# Patient Record
Sex: FEMALE | Race: ASIAN | NOT HISPANIC OR LATINO | URBAN - METROPOLITAN AREA
[De-identification: names, ages, dates, MRNs, and addresses within clinical notes are randomized per-mention and may not be internally consistent; named-entity substitution may affect disease eponyms.]

---

## 2022-04-08 ENCOUNTER — LAB REQUISITION (OUTPATIENT)
Dept: LAB | Facility: CLINIC | Age: 37
End: 2022-04-08

## 2022-04-08 DIAGNOSIS — R42 DIZZINESS AND GIDDINESS: ICD-10-CM

## 2022-04-08 LAB
ALBUMIN SERPL-MCNC: 3.8 G/DL (ref 3.4–5)
ALP SERPL-CCNC: 47 U/L (ref 40–150)
ALT SERPL W P-5'-P-CCNC: 19 U/L (ref 0–50)
ANION GAP SERPL CALCULATED.3IONS-SCNC: 6 MMOL/L (ref 3–14)
AST SERPL W P-5'-P-CCNC: 12 U/L (ref 0–45)
BILIRUB SERPL-MCNC: 0.3 MG/DL (ref 0.2–1.3)
BUN SERPL-MCNC: 11 MG/DL (ref 7–30)
CALCIUM SERPL-MCNC: 8.8 MG/DL (ref 8.5–10.1)
CHLORIDE BLD-SCNC: 106 MMOL/L (ref 94–109)
CO2 SERPL-SCNC: 27 MMOL/L (ref 20–32)
CREAT SERPL-MCNC: 0.58 MG/DL (ref 0.52–1.04)
ERYTHROCYTE [DISTWIDTH] IN BLOOD BY AUTOMATED COUNT: 22.5 % (ref 10–15)
GFR SERPL CREATININE-BSD FRML MDRD: >90 ML/MIN/1.73M2
GLUCOSE BLD-MCNC: 89 MG/DL (ref 70–99)
HCT VFR BLD AUTO: 29 % (ref 35–47)
HGB BLD-MCNC: 8.1 G/DL (ref 11.7–15.7)
MCH RBC QN AUTO: 16.1 PG (ref 26.5–33)
MCHC RBC AUTO-ENTMCNC: 27.9 G/DL (ref 31.5–36.5)
MCV RBC AUTO: 58 FL (ref 78–100)
PLATELET # BLD AUTO: 356 10E3/UL (ref 150–450)
POTASSIUM BLD-SCNC: 3.5 MMOL/L (ref 3.4–5.3)
PROT SERPL-MCNC: 8.9 G/DL (ref 6.8–8.8)
RBC # BLD AUTO: 5.04 10E6/UL (ref 3.8–5.2)
SODIUM SERPL-SCNC: 139 MMOL/L (ref 133–144)
TSH SERPL DL<=0.005 MIU/L-ACNC: 0.77 MU/L (ref 0.4–4)
WBC # BLD AUTO: 6 10E3/UL (ref 4–11)

## 2022-04-08 PROCEDURE — 84443 ASSAY THYROID STIM HORMONE: CPT | Performed by: INTERNAL MEDICINE

## 2022-04-08 PROCEDURE — 80053 COMPREHEN METABOLIC PANEL: CPT | Performed by: INTERNAL MEDICINE

## 2022-04-08 PROCEDURE — 85027 COMPLETE CBC AUTOMATED: CPT | Performed by: INTERNAL MEDICINE

## 2022-04-22 ENCOUNTER — LAB REQUISITION (OUTPATIENT)
Dept: LAB | Facility: CLINIC | Age: 37
End: 2022-04-22

## 2022-04-22 DIAGNOSIS — D50.9 IRON DEFICIENCY ANEMIA, UNSPECIFIED: ICD-10-CM

## 2022-04-22 LAB
FERRITIN SERPL-MCNC: 4 NG/ML (ref 12–150)
IRON SATN MFR SERPL: 4 % (ref 15–46)
IRON SERPL-MCNC: 17 UG/DL (ref 35–180)
TIBC SERPL-MCNC: 436 UG/DL (ref 240–430)

## 2022-04-22 PROCEDURE — 83550 IRON BINDING TEST: CPT | Performed by: INTERNAL MEDICINE

## 2022-04-22 PROCEDURE — 82728 ASSAY OF FERRITIN: CPT | Performed by: INTERNAL MEDICINE

## 2023-06-08 ENCOUNTER — OFFICE VISIT (OUTPATIENT)
Dept: FAMILY MEDICINE | Facility: CLINIC | Age: 38
End: 2023-06-08
Payer: COMMERCIAL

## 2023-06-08 VITALS
RESPIRATION RATE: 16 BRPM | SYSTOLIC BLOOD PRESSURE: 132 MMHG | OXYGEN SATURATION: 100 % | WEIGHT: 118.6 LBS | HEART RATE: 80 BPM | TEMPERATURE: 98.1 F | DIASTOLIC BLOOD PRESSURE: 88 MMHG

## 2023-06-08 DIAGNOSIS — F32.1 CURRENT MODERATE EPISODE OF MAJOR DEPRESSIVE DISORDER WITHOUT PRIOR EPISODE (H): Primary | ICD-10-CM

## 2023-06-08 DIAGNOSIS — D50.9 IRON DEFICIENCY ANEMIA, UNSPECIFIED IRON DEFICIENCY ANEMIA TYPE: ICD-10-CM

## 2023-06-08 PROCEDURE — 99204 OFFICE O/P NEW MOD 45 MIN: CPT | Performed by: FAMILY MEDICINE

## 2023-06-08 RX ORDER — ESCITALOPRAM OXALATE 10 MG/1
10 TABLET ORAL DAILY
Qty: 30 TABLET | Refills: 1 | Status: SHIPPED | OUTPATIENT
Start: 2023-06-08

## 2023-06-08 ASSESSMENT — ANXIETY QUESTIONNAIRES
GAD7 TOTAL SCORE: 17
IF YOU CHECKED OFF ANY PROBLEMS ON THIS QUESTIONNAIRE, HOW DIFFICULT HAVE THESE PROBLEMS MADE IT FOR YOU TO DO YOUR WORK, TAKE CARE OF THINGS AT HOME, OR GET ALONG WITH OTHER PEOPLE: VERY DIFFICULT
3. WORRYING TOO MUCH ABOUT DIFFERENT THINGS: NEARLY EVERY DAY
GAD7 TOTAL SCORE: 17
5. BEING SO RESTLESS THAT IT IS HARD TO SIT STILL: MORE THAN HALF THE DAYS
2. NOT BEING ABLE TO STOP OR CONTROL WORRYING: MORE THAN HALF THE DAYS
6. BECOMING EASILY ANNOYED OR IRRITABLE: NEARLY EVERY DAY
1. FEELING NERVOUS, ANXIOUS, OR ON EDGE: MORE THAN HALF THE DAYS
7. FEELING AFRAID AS IF SOMETHING AWFUL MIGHT HAPPEN: MORE THAN HALF THE DAYS

## 2023-06-08 ASSESSMENT — PATIENT HEALTH QUESTIONNAIRE - PHQ9
SUM OF ALL RESPONSES TO PHQ QUESTIONS 1-9: 20
5. POOR APPETITE OR OVEREATING: NEARLY EVERY DAY

## 2023-06-08 NOTE — PROGRESS NOTES
There are no problems to display for this patient.    Nursing Notes:   Tatyana Reddy, NELLI  6/8/2023  3:09 PM  Signed  Due to patient being non-English speaking/uses sign language, an  was used for this visit. Only for face-to-face interpretation by an external agency, date and length of interpretation can be found on the scanned worksheet.     name: Tristan Gray  Agency: [employer]  Language: Japanese   Telephone number: [phone]  Type of interpretation: Face-to-face, spoken        Chief Complaint   Patient presents with     Other     Depression follow up      Blood pressure 132/88, pulse 80, temperature 98.1  F (36.7  C), temperature source Oral, resp. rate 16, weight 53.8 kg (118 lb 9.6 oz), last menstrual period 06/02/2023, SpO2 100 %.  No results found for any visits on 06/08/23.   Vicente is Here for follow-up of depression  Moved to us in 2018.  Lived with sister but did not get along.    Couldn't speech english, couldn't drive.    Moved out as a result.  Sx seemed to begin then,.    Hard to sleep.  Depressed mood, poor concentration, anxiety also a problem, too  Lived with sister for 1 year.  Then got a job and now lives with a different sister.  Working 2 jobs: electronic factor, and bakery in the afternoon.  Would like to try medication first.  Would consider therapy, too.  No SI/HI  fHx depression:    Family members don't really share, so not really aware.    Patient is a lesbian, but has not shared that with family.  Support system:  Talks to kelsie in [place],  Hoping to petition    GAD7 17 phq9 20.    PMhx  No surgery  Medications: biotin  ALL:  None    ROS:  No wt change.    Sometimes doesn't eat due to fatigue.  Works about 80 hours/week.  Money has given power,   No etoh or drugs    Spiritual:  Bud and Presybeterian--not a source or stress.    Social History     Socioeconomic History     Marital status: Single     Spouse name: None     Number of children: None     Years of  education: None     Highest education level: None   Tobacco Use     Smoking status: Never     Smokeless tobacco: Never   Substance and Sexual Activity     Drug use: Never   Social History Narrative    June 8, 2023    Works 2 jobs    Lives with sister.    Has family in [place].   Has friend in [place], who she is hoping to support    Objective patient is alert pleasant no acute distress   her eye contact is normal   her speech is normal   her affect appears flat but this may be within cultural norms.    HEENT is unremarkable   neck supple no adenopathy or thyromegaly   chest clear   heart regular rhythm without murmur   abdomen soft nontender no organomegaly or masses.    Chart reviewed after she left she has a significant iron deficiency anemia with a hemoglobin of 8.1.  Ferritin was very low.  This is a one-time reading so its not clear if there is an underlying hemoglobinopathy.    Assessment   major depression with anxiety: Patient does not wish to initiate psychotherapy at this time wants to try pharmacotherapy.  Is open to psychotherapy in the future.  We will initiate Lexapro 10 mg daily follow-up in a month discussed expected course of improvement and reasons to follow-up sooner if needed.    Anemia: Recheck CBC and ferritin next time and explore potential causes.  May be component of fatigue

## 2023-06-08 NOTE — NURSING NOTE
Due to patient being non-English speaking/uses sign language, an  was used for this visit. Only for face-to-face interpretation by an external agency, date and length of interpretation can be found on the scanned worksheet.     name: Tristan Gray  Agency: [employer]  Language: Yakut   Telephone number: [phone]  Type of interpretation: Face-to-face, spoken

## 2023-06-08 NOTE — PATIENT INSTRUCTIONS
Order  escitalopram (LEXAPRO) 10 MG tablet [[id number]] (Order [id number])  Order Information    Ordered Status Priority Ordering User Department   06/08/23 Sent (none) Edu Sevilla MD [facility]     Order History  Outpatient  Date/Time Action Taken User Additional Information   06/08/23 1543 Sign Edu Sevilla MD      Outpatient Medication Detail     Disp Refills Start End YOEL   escitalopram (LEXAPRO) 10 MG tablet 30 tablet 1 6/8/2023  --   Sig - Route: Take 1 tablet (10 mg) by mouth daily - Oral   Sent to pharmacy as: Escitalopram Oxalate 10 MG Oral Tablet (LEXAPRO)   Class: E-Prescribe   Order: [id number]   E-Prescribing Status: Receipt confirmed by pharmacy (6/8/2023  3:43 PM CDT)     Printout Tracking    External Result Report     Pharmacy    [id number] [address] AT [facility]     Associated Diagnoses    Current moderate episode of major depressive disorder without prior episode (H) [F32.1]  - Primary         Source Order Set    Order Set Name Order ID    [id number]       Prescribing Provider's NPI: [id number]  Edu Sevilla    Warnings Override History    No Interaction Warnings Shown      Encounter    View Encounter            Tracking Links    [employer] Tracking Order Transmittal Tracking